# Patient Record
Sex: FEMALE | Race: BLACK OR AFRICAN AMERICAN | URBAN - METROPOLITAN AREA
[De-identification: names, ages, dates, MRNs, and addresses within clinical notes are randomized per-mention and may not be internally consistent; named-entity substitution may affect disease eponyms.]

---

## 2018-08-06 LAB — POC BETA-HCG (QUAL): NEGATIVE

## 2018-08-10 ENCOUNTER — HISTORICAL (OUTPATIENT)
Dept: RADIOLOGY | Facility: HOSPITAL | Age: 33
End: 2018-08-10

## 2018-08-20 LAB — POC BETA-HCG (QUAL): NEGATIVE

## 2018-09-11 LAB — POC BETA-HCG (QUAL): NEGATIVE

## 2018-12-03 LAB — POC BETA-HCG (QUAL): NEGATIVE

## 2018-12-06 LAB
ALBUMIN SERPL-MCNC: 3.1 GM/DL (ref 3.4–5)
ALBUMIN/GLOB SERPL: 0.9 {RATIO}
ALP SERPL-CCNC: 34 UNIT/L (ref 38–126)
ALT SERPL-CCNC: 23 UNIT/L (ref 12–78)
AST SERPL-CCNC: 15 UNIT/L (ref 15–37)
B-HCG SERPL QL: NEGATIVE
BILIRUB SERPL-MCNC: 1.2 MG/DL (ref 0.2–1)
BILIRUBIN DIRECT+TOT PNL SERPL-MCNC: 0.2 MG/DL (ref 0–0.2)
BILIRUBIN DIRECT+TOT PNL SERPL-MCNC: 1 MG/DL (ref 0–0.8)
BUN SERPL-MCNC: 9 MG/DL (ref 7–18)
CALCIUM SERPL-MCNC: 8.7 MG/DL (ref 8.5–10.1)
CHLORIDE SERPL-SCNC: 104 MMOL/L (ref 98–107)
CO2 SERPL-SCNC: 28 MMOL/L (ref 21–32)
CREAT SERPL-MCNC: 0.72 MG/DL (ref 0.55–1.02)
ERYTHROCYTE [DISTWIDTH] IN BLOOD BY AUTOMATED COUNT: 15 % (ref 11.5–17)
GLOBULIN SER-MCNC: 3.6 GM/DL (ref 2.4–3.5)
GLUCOSE SERPL-MCNC: 78 MG/DL (ref 74–106)
GROUP & RH: NORMAL
HCT VFR BLD AUTO: 31.9 % (ref 37–47)
HGB BLD-MCNC: 9.6 GM/DL (ref 12–16)
MCH RBC QN AUTO: 23.9 PG (ref 27–31)
MCHC RBC AUTO-ENTMCNC: 30.1 GM/DL (ref 33–36)
MCV RBC AUTO: 79.6 FL (ref 80–94)
PLATELET # BLD AUTO: 324 X10(3)/MCL (ref 130–400)
PMV BLD AUTO: 10.2 FL (ref 9.4–12.4)
POTASSIUM SERPL-SCNC: 4 MMOL/L (ref 3.5–5.1)
PROT SERPL-MCNC: 6.7 GM/DL (ref 6.4–8.2)
RBC # BLD AUTO: 4.01 X10(6)/MCL (ref 4.2–5.4)
SODIUM SERPL-SCNC: 139 MMOL/L (ref 136–145)
TSH SERPL-ACNC: 1.1 MIU/L (ref 0.36–3.74)
WBC # SPEC AUTO: 6.2 X10(3)/MCL (ref 4.5–11.5)

## 2018-12-13 ENCOUNTER — HISTORICAL (OUTPATIENT)
Dept: ADMINISTRATIVE | Facility: HOSPITAL | Age: 33
End: 2018-12-13

## 2018-12-13 LAB — GROUP & RH: NORMAL

## 2022-04-11 ENCOUNTER — HISTORICAL (OUTPATIENT)
Dept: ADMINISTRATIVE | Facility: HOSPITAL | Age: 37
End: 2022-04-11

## 2022-04-29 VITALS
HEIGHT: 62 IN | BODY MASS INDEX: 43.9 KG/M2 | OXYGEN SATURATION: 99 % | WEIGHT: 238.56 LBS | DIASTOLIC BLOOD PRESSURE: 78 MMHG | SYSTOLIC BLOOD PRESSURE: 121 MMHG

## 2022-04-30 NOTE — OP NOTE
DATE OF SURGERY:        SURGEON:  Herb Rae MD    PREOPERATIVE DIAGNOSIS:  Ms. Monroy is a 33-year-old multigravida female status post tubal ligation now with symptomatic menorrhagia with the desire for definitive therapy.    POSTOPERATIVE DIAGNOSIS:  Ms. Monroy is a 33-year-old multigravida female status post tubal ligation now with symptomatic menorrhagia with the desire for definitive therapy.    OPERATION PERFORMED:  Diagnostic hysteroscopy, dilatation and curettage, NovaSure endometrial ablation.    ANESTHESIA:  General.    ESTIMATED BLOOD LOSS:  Minimal.    FLUID DEFICIT:  50 cc normal saline.    ANTIBIOTICS:  None.       Pregnancy test negative day of surgery.  SCDs were placed.    FINDINGS:  An 8-week size anteverted uterus, smooth.  No adnexal masses felt.  Age-appropriate vulva and vagina.  Intrauterine findings showed proliferative endometrium and what is suspicious for an endometrial polyp on the anterior aspect of the uterine wall.  Otherwise normal appearance.    OPERATION IN DETAIL:  The risks, benefits, and alternatives to this procedure were explained in detail to the patient.  She verbalized understanding.  Consents were signed.  She was taken to the operating theater, placed on the table in dorsal supine position with general anesthesia achieved without difficulty.  SCDs were placed.  She was placed in dorsal lithotomy position in Rich stirrups, prepped and draped in the usual sterile fashion.  Bladder was straight cathed of 80 cc clear urine.  She was prepped and draped in the usual sterile fashion.  Graves speculum was placed.  Cervix visualized, grasped at 12 o'clock position with a single-tooth tenaculum.  It was sounded.  The uterine length was obtained at 4.5.  Hanks dilators were used to where I could then place the 5 mm Cedillo hysteroscope into the uterine cavity.  Pictures were obtained.  This hysteroscope was removed.  I curetted the cavity 360 degrees, removing the polyp and  endometrial curettings.  Once that was done, the NovaSure device was inserted.  The antenna was opened and using the up-down, side-side, left-right motion the cavity width was obtained at 4.7.  The cervical collar was advanced and the cavity impedance testing was passed.  The device was enabled and we fulgurated the cavity with the NovaSure device.  The procedure was deemed complete.  The NovaSure antenna was removed.  There was no active bleeding from the cervical os.  The tenaculum was removed and fulgurated with a silver nitrate stick.  The vagina was irrigated with irrigation and noted to be hemostatic at the tenaculum site as well as the cervical os.  All instruments were removed from the patient's vagina.  The sponge, lap, instrument, and needle counts were correct x2.  The patient was cleaned, awoken, taken to the recovery room awake and in stable condition.        ______________________________  MD MIRA Fields/ZAHIDA  DD:  12/13/2018  Time:  11:39AM  DT:  12/13/2018  Time:  12:33PM  Job #:  [id number]

## 2022-09-22 ENCOUNTER — HISTORICAL (OUTPATIENT)
Dept: ADMINISTRATIVE | Facility: HOSPITAL | Age: 37
End: 2022-09-22